# Patient Record
Sex: MALE | Race: WHITE | Employment: FULL TIME | URBAN - NONMETROPOLITAN AREA
[De-identification: names, ages, dates, MRNs, and addresses within clinical notes are randomized per-mention and may not be internally consistent; named-entity substitution may affect disease eponyms.]

---

## 2019-05-27 ENCOUNTER — HOSPITAL ENCOUNTER (EMERGENCY)
Age: 54
Discharge: HOME | End: 2019-05-27
Attending: EMERGENCY MEDICINE
Payer: COMMERCIAL

## 2019-05-27 VITALS
OXYGEN SATURATION: 96 % | TEMPERATURE: 98.5 F | HEIGHT: 69 IN | SYSTOLIC BLOOD PRESSURE: 162 MMHG | HEART RATE: 74 BPM | RESPIRATION RATE: 16 BRPM | BODY MASS INDEX: 29.62 KG/M2 | DIASTOLIC BLOOD PRESSURE: 95 MMHG | WEIGHT: 200 LBS

## 2019-05-27 DIAGNOSIS — R21 RASH AND OTHER NONSPECIFIC SKIN ERUPTION: Primary | ICD-10-CM

## 2019-05-27 DIAGNOSIS — R03.0 ELEVATED BP WITHOUT DIAGNOSIS OF HYPERTENSION: ICD-10-CM

## 2019-05-27 PROCEDURE — 6370000000 HC RX 637 (ALT 250 FOR IP): Performed by: EMERGENCY MEDICINE

## 2019-05-27 PROCEDURE — 99282 EMERGENCY DEPT VISIT SF MDM: CPT

## 2019-05-27 RX ORDER — PREDNISONE 20 MG/1
40 TABLET ORAL ONCE
Status: COMPLETED | OUTPATIENT
Start: 2019-05-27 | End: 2019-05-27

## 2019-05-27 RX ORDER — PREDNISONE 10 MG/1
40 TABLET ORAL DAILY
Qty: 20 TABLET | Refills: 0 | Status: SHIPPED | OUTPATIENT
Start: 2019-05-27 | End: 2019-06-01

## 2019-05-27 RX ADMIN — PREDNISONE 40 MG: 20 TABLET ORAL at 09:06

## 2019-05-27 NOTE — ED PROVIDER NOTES
Emergency Department provider note:    CHIEF COMPLAINT  Rash (Patient with fine, raised, rash to bilateral arms and face. Patient states rash and swelling started on Wednesday and worsened over the weekend. 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 male who presents to the ED with a raised rash arms which is spread to his chest and now he's had some facial swelling. The patient has had no difficulty swallowing or breathing, no fever. He doesn't know of any occupational exposures but states he gets this once or twice a year every year. He doesn't believe he has been around poison ivy. No one else has any similar rash in his family or at work. No other complaints, modifying factors or associated symptoms. Nursing notes reviewed. Past Medical History:   Diagnosis Date    Influenza B 12/23/2017    Pneumonia      History reviewed. No pertinent surgical history. History reviewed. No pertinent family history. Social History     Socioeconomic History    Marital status:      Spouse name: Not on file    Number of children: Not on file    Years of education: Not on file    Highest education level: Not on file   Occupational History    Not on file   Social Needs    Financial resource strain: Not on file    Food insecurity:     Worry: Not on file     Inability: Not on file    Transportation needs:     Medical: Not on file     Non-medical: Not on file   Tobacco Use    Smoking status: Current Some Day Smoker     Types: Cigars    Smokeless tobacco: Never Used   Substance and Sexual Activity    Alcohol use:  Yes     Alcohol/week: 1.2 oz     Types: 2 Cans of beer per week    Drug use: No    Sexual activity: Not Currently   Lifestyle    Physical activity:     Days per week: Not on file     Minutes per session: Not on file    Stress: Not on file   Relationships    Social connections:     Talks on phone: Not on file     Gets together: Not on file     Attends [employer] service: Not on file     Active member of club or organization: Not on file     Attends meetings of clubs or organizations: Not on file     Relationship status: Not on file    Intimate partner violence:     Fear of current or ex partner: Not on file     Emotionally abused: Not on file     Physically abused: Not on file     Forced sexual activity: Not on file   Other Topics Concern    Not on file   Social History Narrative    Not on file     Current Facility-Administered Medications   Medication Dose Route Frequency Provider Last Rate Last Dose    predniSONE (DELTASONE) tablet 40 mg  40 mg Oral Once Rene Farah MD         Current Outpatient Medications   Medication Sig Dispense Refill    predniSONE (DELTASONE) 10 MG tablet Take 4 tablets by mouth daily for 5 days 20 tablet 0    Aspirin Effervescent 325 MG TBEF tablet Take by mouth daily      albuterol sulfate HFA (PROAIR HFA) 108 (90 Base) MCG/ACT inhaler Inhale 2 puffs into the lungs every 6 hours as needed for Wheezing 1 Inhaler 3    Dextromethorphan-Guaifenesin (MUCINEX DM)  MG TB12 Take 1 tablet by mouth every 12 hours as needed (cough) 28 tablet 0     No Known Allergies    REVIEW OF SYSTEMS  10 systems reviewed, pertinent positives per HPI otherwise noted to be negative    PHYSICAL EXAM  BP (!) 162/95   Pulse 74   Temp 98.5 °F (36.9 °C) (Oral)   Resp 16   Ht 5' 9\" (1.753 m)   Wt 200 lb (90.7 kg)   SpO2 96%   BMI 29.53 kg/m²   GENERAL APPEARANCE: Awake and alert. Cooperative. No acute distress. HEAD: Normocephalic. Atraumatic. Few raised areas on his face. EYES: PERRL. EOM's grossly intact. Periorbital swelling bilaterally. ENT: Mucous membranes are moist.   NECK: Supple. Normal ROM. No stridor  CHEST: Equal symmetric chest rise. Heart regular rate and rhythm with no murmurs  LUNGS: Breathing is unlabored. Speaking comfortably in full sentences.   Lungs clear auscultation with no wheeze or stridor. ABDOMEN: Nondistended  EXTREMITIES: MAEE. No acute deformities. SKIN: Warm and dry. Raised, and excoriated rash predominantly on the sun exposed surfaces on both of his arms, no blistering, no pustules, no cellulitic areas. No lymphangitic streaks. NEUROLOGICAL: Alert and oriented. Strength is 5/5 in all extremities and sensation is intact. LABS  No results found for this visit on 05/27/19. RADIOLOGY  No orders to display         ED COURSE/MDM  Patient seen and evaluated. Patient was seen for a raised red itchy rash predominantly affecting his arms but he said symptoms periorbital swelling as well, no lip or tongue or uvular swelling. He's had no stridor or wheeze. No signs of anaphylaxis, however does seem to be having some form of allergic reaction, likely environmental, is given a dose of steroids here, he has been taking drill at home and I gave him instructions on how to take Benadryl, also given traction son taking Pepcid twice a day. We will prescribe a steroid burst, I am referring him to our [facility], for routine follow-up as well as for follow-up of his elevated blood pressure here today. Patient was given scripts for the following medications. I counseled patient how to take these medications. New Prescriptions    PREDNISONE (DELTASONE) 10 MG TABLET    Take 4 tablets by mouth daily for 5 days           CLINICAL IMPRESSION  1. Rash and other nonspecific skin eruption    2. Elevated BP without diagnosis of hypertension        Blood pressure (!) 162/95, pulse 74, temperature 98.5 °F (36.9 °C), temperature source Oral, resp. rate 16, height 5' 9\" (1.753 m), weight 200 lb (90.7 kg), SpO2 96 %. DISPOSITION  Patient was discharged to home in good condition.     Comment: Please note this report has been produced using speech recognition software and may contain errors related to that system including errors in grammar, punctuation, and spelling, as well as words and phrases that may be inappropriate. If there are any questions or concerns please feel free to contact the dictating provider for clarification.      Dorcas Morris MD  05/27/19 6694

## 2019-06-10 ENCOUNTER — HOSPITAL ENCOUNTER (EMERGENCY)
Age: 54
Discharge: HOME | End: 2019-06-10
Payer: COMMERCIAL

## 2019-06-10 VITALS
HEART RATE: 65 BPM | DIASTOLIC BLOOD PRESSURE: 117 MMHG | BODY MASS INDEX: 30.07 KG/M2 | TEMPERATURE: 98.6 F | WEIGHT: 203 LBS | RESPIRATION RATE: 18 BRPM | HEIGHT: 69 IN | SYSTOLIC BLOOD PRESSURE: 148 MMHG | OXYGEN SATURATION: 98 %

## 2019-06-10 DIAGNOSIS — R03.0 ELEVATED BLOOD PRESSURE READING: ICD-10-CM

## 2019-06-10 DIAGNOSIS — L30.9 DERMATITIS: Primary | ICD-10-CM

## 2019-06-10 PROCEDURE — 2500000003 HC RX 250 WO HCPCS: Performed by: PHYSICIAN ASSISTANT

## 2019-06-10 PROCEDURE — 99282 EMERGENCY DEPT VISIT SF MDM: CPT

## 2019-06-10 PROCEDURE — 6360000002 HC RX W HCPCS: Performed by: PHYSICIAN ASSISTANT

## 2019-06-10 PROCEDURE — 96375 TX/PRO/DX INJ NEW DRUG ADDON: CPT

## 2019-06-10 PROCEDURE — 96374 THER/PROPH/DIAG INJ IV PUSH: CPT

## 2019-06-10 RX ORDER — DEXAMETHASONE SODIUM PHOSPHATE 10 MG/ML
10 INJECTION INTRAMUSCULAR; INTRAVENOUS ONCE
Status: COMPLETED | OUTPATIENT
Start: 2019-06-10 | End: 2019-06-10

## 2019-06-10 RX ORDER — PREDNISONE 10 MG/1
TABLET ORAL
Qty: 30 TABLET | Refills: 0 | Status: SHIPPED | OUTPATIENT
Start: 2019-06-10 | End: 2019-06-20

## 2019-06-10 RX ORDER — DIPHENHYDRAMINE HYDROCHLORIDE 50 MG/ML
25 INJECTION INTRAMUSCULAR; INTRAVENOUS ONCE
Status: COMPLETED | OUTPATIENT
Start: 2019-06-10 | End: 2019-06-10

## 2019-06-10 RX ADMIN — FAMOTIDINE 20 MG: 10 INJECTION, SOLUTION INTRAVENOUS at 13:53

## 2019-06-10 RX ADMIN — DEXAMETHASONE SODIUM PHOSPHATE 10 MG: 10 INJECTION, SOLUTION INTRAMUSCULAR; INTRAVENOUS at 13:53

## 2019-06-10 RX ADMIN — DIPHENHYDRAMINE HYDROCHLORIDE 25 MG: 50 INJECTION, SOLUTION INTRAMUSCULAR; INTRAVENOUS at 13:53

## 2019-06-10 ASSESSMENT — ENCOUNTER SYMPTOMS
SORE THROAT: 1
COUGH: 0
SHORTNESS OF BREATH: 0
VOMITING: 0

## 2019-06-10 ASSESSMENT — PAIN DESCRIPTION - FREQUENCY: FREQUENCY: CONTINUOUS

## 2019-06-10 ASSESSMENT — PAIN DESCRIPTION - DESCRIPTORS: DESCRIPTORS: PINS AND NEEDLES

## 2019-06-10 NOTE — LETTER
[address] [facility] Emergency Department  [place] [address]  Phone: [phone]               Mirna 10, 2019    Patient: Liz Eduardo   YOB: 1965   Date of Visit: 6/10/2019       To Whom It May Concern:    Liz Eduardo was seen and treated in our emergency department on 6/10/2019. Please excuse from work 6/10 and 6/11.       Sincerely,       Oriana Garvey PA-C         Signature:__________________________________

## 2019-06-10 NOTE — ED NOTES
Patient provided with discharge instructions and any prescriptions. Follow-up reviewed with patient/family. No further questions verbalized at this time. Vital signs and patient stable upon discharge.         Vic Zepeda RN  06/10/19 [id number]

## 2019-06-10 NOTE — ED PROVIDER NOTES
Evaluated by [facility] supervising physician available for consult    Patient complains of itchy rash bilateral arms has been there for about the past 4 weeks states he gets this a couple of times every year and normally just goes away but this year has been worse and has also started to get on his face was seen in the ER a couple weeks ago for same was having rash and swelling of the face treated with steroids states he felt much better with the steroids and rash was almost gone but when the steroids ran out rash came back and currently on dorsal aspects of both arms at face and around his neck and today neck feeling swollen. No trouble breathing. Has not seen anyone in follow-up since last ER visit. No known exposures. No recent illness. No cough or cold symptoms. No fever. No vomiting. No new soaps, lotions, detergents or medicines. The history is provided by the patient. Rash   Location:  Face and shoulder/arm  Facial rash location:  Face  Shoulder/arm rash location:  L forearm, R forearm, L upper arm and R upper arm  Quality: itchiness, redness and swelling    Quality: not blistering and not painful    Onset quality:  Gradual  Duration:  4 weeks  Progression:  Worsening  Chronicity:  Recurrent  Context: sun exposure    Context: not chemical exposure, not exposure to similar rash, not food, not medications, not new detergent/soap, not plant contact and not sick contacts    Associated symptoms: sore throat    Associated symptoms: no fever, no shortness of breath and not vomiting        Review of Systems   Constitutional: Negative for chills and fever. HENT: Positive for sore throat. Respiratory: Negative for cough and shortness of breath. Cardiovascular: Negative for chest pain. Gastrointestinal: Negative for vomiting. Skin: Positive for rash. PAST MEDICAL HISTORY   has a past medical history of Influenza B (12/23/2017) and Pneumonia.     PAST SURGICAL HISTORY   has no past surgical history on file. FAMILY HISTORY  family history is not on file. SOCIAL HISTORY   reports that he has been smoking cigars. He has never used smokeless tobacco. He reports that he drinks about 1.2 oz of alcohol per week. He reports that he does not use drugs. HOME MEDICATIONS     Prior to Admission medications    Medication Sig Start Date End Date Taking? Authorizing Provider   Aspirin Effervescent 325 MG TBEF tablet Take by mouth daily    Historical Provider, MD   albuterol sulfate HFA (PROAIR HFA) 108 (90 Base) MCG/ACT inhaler Inhale 2 puffs into the lungs every 6 hours as needed for Wheezing 12/23/17   Alfie Median, APRN - CNP   Dextromethorphan-Guaifenesin [facility] DM)  MG TB12 Take 1 tablet by mouth every 12 hours as needed (cough) 12/23/17   Alfie Median, APRN - CNP        ALLERGIES  has No Known Allergies. BP (!) 148/117   Pulse 65   Temp 98.6 °F (37 °C) (Oral)   Resp 18   Ht 5' 9\" (1.753 m)   Wt 203 lb (92.1 kg)   SpO2 98%   BMI 29.98 kg/m²     Physical Exam   Constitutional: He is oriented to person, place, and time. He appears well-developed and well-nourished. Non-toxic appearance. He does not have a sickly appearance. Alert and oriented. Well appearing. No apparent trouble breathing or swallowing. Speaking in full unlabored sentences. Holding own secretions. HENT:   Head: Normocephalic and atraumatic. Mouth/Throat: Uvula is midline, oropharynx is clear and moist and mucous membranes are normal. No trismus in the jaw. No uvula swelling. No posterior oropharyngeal edema or posterior oropharyngeal erythema. No oral swelling. Oropharynx is clear. No lip or tongue swelling. Normal oral exam. Normal voice. Eyes: Conjunctivae are normal.   Neck: Normal range of motion. Neck supple. No neck rigidity. Cardiovascular: Normal rate, regular rhythm, normal heart sounds and intact distal pulses.    Pulses:       Radial pulses are 2+ on the right side, and 2+ on the left side. Pulmonary/Chest: Effort normal and breath sounds normal. No stridor. No respiratory distress. He has no decreased breath sounds. He has no wheezes. He has no rhonchi. He has no rales. Neurological: He is alert and oriented to person, place, and time. He exhibits normal muscle tone. Skin: Skin is warm and dry. Capillary refill takes less than 2 seconds. Rash noted. Psychiatric: He has a normal mood and affect. His behavior is normal.   Vitals reviewed. Procedures    MDM  Number of Diagnoses or Management Options     Amount and/or Complexity of Data Reviewed  Review and summarize past medical records: yes    Patient Progress  Patient progress: improved         2:29 PM  Recheck patient. States he is feeling better. Denies any trouble swallowing or feeling like his throat is swollen. Initially he did have visible mild swelling at the rash on his neck and that has gone down since steroids here. He is drinking water without any difficulty. No difficulty breathing. Discussed with patient I believe rash involves sun exposure and advised him to stay out of the sun as much as possible and use sunscreen when he is outside. States that the steroids really helped last time we will prescribe a steroid taper he will continue Benadryl and Pepcid as needed. He is given referral to dermatology for follow-up. We also discussed elevated blood pressure reading and need to follow-up with family doctor discussed risk of untreated hypertension he understands. States he had been on BP medications in the past but had side effects so he stopped taking them. Advised return to the ER for any worsening symptoms. He understands and agrees. Impression dermatitis    I estimate there is LOW risk for ANAPHYLAXIS, HARVEY-CLOVIS SYNDROME, OR TOXIC EPIDERMAL NECROLYSIS thus I consider the discharge disposition reasonable. Please note that this chart was generated using Dragon dictation software. Although every effort was made to ensure the accuracy of this automated transcription, some errors in transcription may have occurred           Mira Jimenez PA-C  06/10/19 [id number]

## 2019-06-10 NOTE — ED NOTES
Pt to ED with c/o rash for 3-4 weeks over arms, face and neck. States he was seen here for it and placed on Steroids and that it went away and now has come back worse than before. Feels throat is swelling, starting today, no difficulty breathing. Denies new foods or meds. Reports itch and states it \"feels like thousands of needles\". Pt alert and oriented, no acute distress otherwise noted. VS updated. IV placed and medications given as ordered.       Emani Penny RN  06/10/19 [id number]